# Patient Record
(demographics unavailable — no encounter records)

---

## 2017-09-26 NOTE — CT
CT BRAIN WITHOUT CONTRAST:

 

Date:  09/26/17 

 

HISTORY:  

Headache. Dizziness. 

 

FINDINGS:

 

Comparison made with exam of 05/26/14. 

 

No evidence of acute infarct, hemorrhage, midline shift, or abnormal extra-axial fluid collections a
re seen. The ventricular size is normal and the basilar cisterns are patent. The bony calvarium is i
ntact. There is minimal mucosal disease in the paranasal sinuses. 

 

IMPRESSION: 

No CT evidence of acute intracranial process.  

 

POS: [facility]

## 2017-09-26 NOTE — RAD
SINGLE VIEW OF CHEST:

 

Date:  09/26/17 

 

COMPARISON:  

05/26/14. 

 

HISTORY:  

Dizziness and headache. 

 

FINDINGS:

Single view of the chest shows a normal sized cardiomediastinal silhouette. There is no evidence of 
consolidation, mass, or pleural effusion. The bones are unremarkable. 

 

IMPRESSION: 

No evidence of acute cardiopulmonary disease.  

 

 

POS: [facility]

## 2017-09-27 NOTE — HP
DATE:  09/26/2017

 

PRIMARY CARE PHYSICIAN:  Dr. Azar Bradford.

 

CHIEF COMPLAINT:  Headache and dizziness.

 

HISTORY OF PRESENT ILLNESS:  This is a 63-year-old  male with past 
medical history of myasthenia gravis, vertigo, headaches, who presented to the 
emergency room with severe vertigo symptoms and a severe headache, rated 10/10.
  He states his headache was in the posterior portion of the head.  He has had 
multiple episodes similar to this and been admitted to the hospital with 
multiple tests.  He states an MRI was done 2 months ago, but I do not see one 
in [facility].  It is possible he had it done off site.  In the 
emergency room, he had a brain CT which showed no acute process.  He was also 
given pain medicine which improved his headache.  He continues to have vertigo 
symptoms currently and is lying down.

 

ALLERGIES:  No known drug allergies.

 

MEDICATIONS:

1.  Mestinon 60 mg p.o. q.8 hours.

2.  Vitamin D3 of 1000 units p.o. daily.

3.  Fish oil 1000 mg 2 capsules p.o. b.i.d.

4.  Cialis 20 mg p.o. p.r.n.

5.  Amlodipine 5 mg p.o. daily.

6.  Simvastatin 40 mg p.o. daily.

7.  Aspirin 81 mg p.o. daily.

 

PAST MEDICAL HISTORY:

1.  Myasthenia gravis.

2.  History of vertigo symptoms.

3.  Hyperlipidemia.

4.  Vitamin D deficiency.

 

PAST SURGICAL HISTORY:

1.  Left knee arthroscopy.

2.  Left foot surgery.

3.  EGD.

4.  Colonoscopy.

 

SOCIAL HISTORY:  Denies tobacco, alcohol and drugs.

 

REVIEW OF SYSTEMS:  General:  Denies fever, weight change, appetite change.  
HEENT:  Reports severe headache.  Denies vision changes and sore throat.  Skin:
  Denies rashes and lesions.  Cardiovascular:  Denies chest pain, palpitations.
  Respiratory:  Denies shortness of breath and cough.  Gastrointestinal:  
Reports nausea and vomiting.  Denies diarrhea, constipation, abdominal pain.  
Genitourinary:  Denies dysuria, hematuria, and discharge.  Musculoskeletal:  
Denies joint pain and stiffness.  Neurologic:  Reports dizziness described as 
room spinning.  Denies syncope.

 

PHYSICAL EXAMINATION:

VITAL SIGNS:  Blood pressure 133/84, pulse 52, respirations 18, temperature 98.5
, oxygen saturation 94% on room air.

GENERAL:  Alert and oriented x3 with no acute distress.

SKIN:  No rashes or lesions.

HEENT:  Normocephalic.  Pupils are equally round and react to light.  
Extraocular muscles intact.  Moist mucous membranes with a nonerythematous 
throat.

HEART:  Regular rate and rhythm, no murmurs.

LUNGS:  Clear to auscultation bilaterally, no wheezes.

ABDOMEN:  Soft, nontender, nondistended.  Bowel sounds heard throughout.

MUSCULOSKELETAL:  Normal strength and range of motion.

NEUROLOGIC:  Cranial nerves II-XII are intact.  Sensation within normal limits.

 

LABORATORY AND X-RAY FINDINGS:  White blood cell count 8.5, hemoglobin 15.8, 
hematocrit 46.6, MCV 88.3, platelets 217.

 

Sodium 141, potassium 3.5, chloride 107, carbon dioxide 22, BUN 50, creatinine 
0.74, glucose 133, calcium 8.9, total bilirubin 0.6, AST 31, ALT 32, alkaline 
phosphatase 70, creatinine kinase 151, CK-MB 2.8, troponin less than 0.010, 
total protein 7.7, albumin 4.2, lipase 24.

 

Chest x-ray showed no acute process.

 

Brain CT showed no acute process.

 

ASSESSMENT AND PLAN:

1.  Severe headache and vertigo, rule out stroke or transient ischemic attack.  
Neurology has been consulted.  He has established with Dr. Deepak Aguilar.  I will 
wait to obtain further imaging because he may have had a recent MRI that was 
normal.

2.  Myasthenia gravis.  This appears to be stable as long as he is taking his 
home medicines of Mestinon.

3.  Hyperlipidemia.  Continue home statin.

4.  Nausea.  We will give Zofran p.r.nArden BETANCOURT

## 2017-09-27 NOTE — DIS
DISCHARGE DIAGNOSES:

1.  Recurrent vertiginous migraine.

2.  Intractable nausea and vomiting.

3.  Myasthenia gravis.

4.  Hyperlipidemia.

5.  Hypertension.

6.  Obesity.

 

DISCHARGE MEDICATIONS:  Aspirin 81 mg daily, fish oil 1000 b.i.d., Norvasc 5 mg daily, Mestinon 60 m
g p.o. t.i.d., simvastatin 40 daily, promethazine 25 p.o. q.6 h. p.r.n. nausea, vomiting,  Imitrex 1
00 one p.r.n. headache, may repeat x1 per day #10, 2 refills.

 

BRIEF HISTORY:  This is a 63-year-old white male with history of myasthenia gravis, vertigo, recurre
nt vertiginous migraine headaches, who presents with episode of severe vertigo with nausea and vomit
ing, intractable.  When seen in ER he had markedly elevated blood pressures.  He had a recent MRI 2 
months ago, which was unremarkable.  CT in ER was unremarkable as well as a chest x-ray.

 

HOSPITAL COURSE:  The patient was given IV Decadron and IV fluids.  Overnight, he did well.  This mo
rning, he has a slight headache with mild vertigo.  Nausea and vomiting has resolved, he is now read
y for discharge.  

 

Blood pressure is 134/72, temperature 96.5, pulse 56.  

 

White count 10.6, H\T\H 15 and 46, platelet 227.  Electrolytes normal.  Creatinine 23, BUN 21, blood
 sugar 150.  

 

Dr. Parker was consulted as well and gave the above recommendations.

## 2017-09-27 NOTE — CON
DATE OF CONSULTATION:  09/26/2017

 

CONSULTING PHYSICIAN:  [facility].

 

IMPRESSION:

1.  Recurrent vertiginous migraine.

2.  Recent diagnosis of myasthenia gravis.

 

PLAN:

1.  DHE and Phenergan protocol.

2.  Valproic acid 1000 mg IV.

3.  Decadron 10 mg IV.

4.  Follow up with Dr. Aguilar.

 

Mr. Jean-Baptiste is a 63-year-old man, who was recently diagnosed with myasthenia gravis about 3 months ago.
  He has had recurrent episodes of headache, vertigo and vomiting for a number of years.  He has bee
n worked up at other hospitals in the past and diagnosed as migraine.  He had recurrent symptoms tod
ay with persistent nausea and vomiting.  He came into the emergency room and was given a liter of fl
uid and some Zofran.  He still has a frontal headache and vertigo associated with head movement.  He
 is not having any focal neurologic symptoms otherwise.  He has had some recent generalized weakness
 and ptosis that brought the myasthenia to light.

 

ALLERGIES:  None.

 

SOCIAL HISTORY:  Unremarkable.

 

FAMILY HISTORY:  Unremarkable.

 

REVIEW OF SYSTEMS:  Unremarkable.

 

PHYSICAL EXAMINATION:

GENERAL:  He is a well-nourished middle-aged man, in mild distress.

HEENT:  Pupils equal and reactive.  Conjunctivae clear.  No nystagmus present.

NECK:  Supple.

EXTREMITIES:  No cyanosis.

NEUROLOGIC:  He is alert and appropriate.  Speech is fluent and clear.  His exam is nonfocal.

 

CT of the brain without contrast was normal.

 

LABORATORY STUDIES:  Normal.

 

SUMMARY:  This gentleman is having recurrent vertiginous migraine.  We will start migraine protocol 
and hopefully he can be discharged home in the morning.